# Patient Record
Sex: FEMALE | Race: WHITE | NOT HISPANIC OR LATINO | Employment: UNEMPLOYED | URBAN - NONMETROPOLITAN AREA
[De-identification: names, ages, dates, MRNs, and addresses within clinical notes are randomized per-mention and may not be internally consistent; named-entity substitution may affect disease eponyms.]

---

## 2022-11-05 ENCOUNTER — HOSPITAL ENCOUNTER (OUTPATIENT)
Dept: LAB | Facility: MEDICAL CENTER | Age: 37
End: 2022-11-05
Attending: PHYSICIAN ASSISTANT
Payer: COMMERCIAL

## 2022-11-05 LAB
ALBUMIN SERPL BCP-MCNC: 4.4 G/DL (ref 3.2–4.9)
ALBUMIN/GLOB SERPL: 1.5 G/DL
ALP SERPL-CCNC: 57 U/L (ref 30–99)
ALT SERPL-CCNC: 11 U/L (ref 2–50)
ANION GAP SERPL CALC-SCNC: 9 MMOL/L (ref 7–16)
AST SERPL-CCNC: 15 U/L (ref 12–45)
BASOPHILS # BLD AUTO: 0.8 % (ref 0–1.8)
BASOPHILS # BLD: 0.05 K/UL (ref 0–0.12)
BILIRUB SERPL-MCNC: 0.3 MG/DL (ref 0.1–1.5)
BUN SERPL-MCNC: 13 MG/DL (ref 8–22)
CALCIUM SERPL-MCNC: 9.1 MG/DL (ref 8.5–10.5)
CHLORIDE SERPL-SCNC: 107 MMOL/L (ref 96–112)
CO2 SERPL-SCNC: 22 MMOL/L (ref 20–33)
CREAT SERPL-MCNC: 0.67 MG/DL (ref 0.5–1.4)
EOSINOPHIL # BLD AUTO: 0.15 K/UL (ref 0–0.51)
EOSINOPHIL NFR BLD: 2.5 % (ref 0–6.9)
ERYTHROCYTE [DISTWIDTH] IN BLOOD BY AUTOMATED COUNT: 42.5 FL (ref 35.9–50)
EST. AVERAGE GLUCOSE BLD GHB EST-MCNC: 111 MG/DL
GFR SERPLBLD CREATININE-BSD FMLA CKD-EPI: 114 ML/MIN/1.73 M 2
GLOBULIN SER CALC-MCNC: 3 G/DL (ref 1.9–3.5)
GLUCOSE SERPL-MCNC: 101 MG/DL (ref 65–99)
HBA1C MFR BLD: 5.5 % (ref 4–5.6)
HCT VFR BLD AUTO: 42.3 % (ref 37–47)
HGB BLD-MCNC: 13.5 G/DL (ref 12–16)
IMM GRANULOCYTES # BLD AUTO: 0.03 K/UL (ref 0–0.11)
IMM GRANULOCYTES NFR BLD AUTO: 0.5 % (ref 0–0.9)
LYMPHOCYTES # BLD AUTO: 1.89 K/UL (ref 1–4.8)
LYMPHOCYTES NFR BLD: 31.4 % (ref 22–41)
MCH RBC QN AUTO: 28.4 PG (ref 27–33)
MCHC RBC AUTO-ENTMCNC: 31.9 G/DL (ref 33.6–35)
MCV RBC AUTO: 88.9 FL (ref 81.4–97.8)
MONOCYTES # BLD AUTO: 0.55 K/UL (ref 0–0.85)
MONOCYTES NFR BLD AUTO: 9.2 % (ref 0–13.4)
NEUTROPHILS # BLD AUTO: 3.34 K/UL (ref 2–7.15)
NEUTROPHILS NFR BLD: 55.6 % (ref 44–72)
NRBC # BLD AUTO: 0 K/UL
NRBC BLD-RTO: 0 /100 WBC
PLATELET # BLD AUTO: 304 K/UL (ref 164–446)
PMV BLD AUTO: 11.7 FL (ref 9–12.9)
POTASSIUM SERPL-SCNC: 4.4 MMOL/L (ref 3.6–5.5)
PROT SERPL-MCNC: 7.4 G/DL (ref 6–8.2)
RBC # BLD AUTO: 4.76 M/UL (ref 4.2–5.4)
SODIUM SERPL-SCNC: 138 MMOL/L (ref 135–145)
TSH SERPL DL<=0.005 MIU/L-ACNC: 2.36 UIU/ML (ref 0.38–5.33)
WBC # BLD AUTO: 6 K/UL (ref 4.8–10.8)

## 2022-11-05 PROCEDURE — 85025 COMPLETE CBC W/AUTO DIFF WBC: CPT

## 2022-11-05 PROCEDURE — 83036 HEMOGLOBIN GLYCOSYLATED A1C: CPT

## 2022-11-05 PROCEDURE — 36415 COLL VENOUS BLD VENIPUNCTURE: CPT

## 2022-11-05 PROCEDURE — 80053 COMPREHEN METABOLIC PANEL: CPT

## 2022-11-05 PROCEDURE — 84443 ASSAY THYROID STIM HORMONE: CPT

## 2024-02-14 ENCOUNTER — HOSPITAL ENCOUNTER (EMERGENCY)
Facility: MEDICAL CENTER | Age: 39
End: 2024-02-14
Attending: STUDENT IN AN ORGANIZED HEALTH CARE EDUCATION/TRAINING PROGRAM
Payer: COMMERCIAL

## 2024-02-14 VITALS
BODY MASS INDEX: 29.64 KG/M2 | WEIGHT: 156.97 LBS | OXYGEN SATURATION: 97 % | DIASTOLIC BLOOD PRESSURE: 85 MMHG | SYSTOLIC BLOOD PRESSURE: 137 MMHG | RESPIRATION RATE: 17 BRPM | HEIGHT: 61 IN | TEMPERATURE: 97.2 F | HEART RATE: 80 BPM

## 2024-02-14 DIAGNOSIS — F41.9 ANXIETY: ICD-10-CM

## 2024-02-14 DIAGNOSIS — I10 PRIMARY HYPERTENSION: ICD-10-CM

## 2024-02-14 PROCEDURE — 93005 ELECTROCARDIOGRAM TRACING: CPT | Performed by: STUDENT IN AN ORGANIZED HEALTH CARE EDUCATION/TRAINING PROGRAM

## 2024-02-14 PROCEDURE — A9270 NON-COVERED ITEM OR SERVICE: HCPCS | Mod: UD | Performed by: STUDENT IN AN ORGANIZED HEALTH CARE EDUCATION/TRAINING PROGRAM

## 2024-02-14 PROCEDURE — 99283 EMERGENCY DEPT VISIT LOW MDM: CPT

## 2024-02-14 PROCEDURE — 700102 HCHG RX REV CODE 250 W/ 637 OVERRIDE(OP): Mod: UD | Performed by: STUDENT IN AN ORGANIZED HEALTH CARE EDUCATION/TRAINING PROGRAM

## 2024-02-14 RX ORDER — DIAZEPAM 5 MG/1
TABLET ORAL
Status: DISPENSED
Start: 2024-02-14 | End: 2024-02-14

## 2024-02-14 RX ORDER — PROPRANOLOL HYDROCHLORIDE 10 MG/1
10 TABLET ORAL ONCE
Status: COMPLETED | OUTPATIENT
Start: 2024-02-14 | End: 2024-02-14

## 2024-02-14 RX ORDER — PROPRANOLOL HYDROCHLORIDE 10 MG/1
TABLET ORAL
Status: DISPENSED
Start: 2024-02-14 | End: 2024-02-14

## 2024-02-14 RX ORDER — HYDROXYZINE HYDROCHLORIDE 25 MG/1
25 TABLET, FILM COATED ORAL 3 TIMES DAILY PRN
Qty: 30 TABLET | Refills: 0 | Status: SHIPPED | OUTPATIENT
Start: 2024-02-14

## 2024-02-14 RX ORDER — PROPRANOLOL HYDROCHLORIDE 20 MG/1
20 TABLET ORAL 3 TIMES DAILY
Qty: 90 TABLET | Refills: 11 | Status: SHIPPED | OUTPATIENT
Start: 2024-02-14

## 2024-02-14 RX ORDER — DIAZEPAM 5 MG/1
5 TABLET ORAL ONCE
Status: COMPLETED | OUTPATIENT
Start: 2024-02-14 | End: 2024-02-14

## 2024-02-14 RX ADMIN — DIAZEPAM 5 MG: 5 TABLET ORAL at 03:12

## 2024-02-14 RX ADMIN — PROPRANOLOL HYDROCHLORIDE 10 MG: 10 TABLET ORAL at 03:12

## 2024-02-14 ASSESSMENT — FIBROSIS 4 INDEX: FIB4 SCORE: 0.6

## 2024-02-14 NOTE — ED PROVIDER NOTES
ED Provider Note    CHIEF COMPLAINT  Chief Complaint   Patient presents with    Anxiety     Hx. Of anxiety       EXTERNAL RECORDS REVIEWED  Outpatient Notes outpatient visit in 2022 for anxiety on Lexapro.  Patient has previous elective surgery visits and admissions.    HPI/ROS  LIMITATION TO HISTORY   Select: : None  OUTSIDE HISTORIAN(S):  Significant other spouse    Rocio Martinez is a 38 y.o. female who presents due to anxiety, palpitations, trouble sleeping the past 2 nights.  Patient notes a history of anxiety and depression has been on Lexapro for several years.  She was on 10 mg and about 6 months ago decrease this to 5 mg.  Since November or December she has been having gradually increasing anxiety, frequent episodes where she notices palpitations.  For the past 2 days she notes she has not been able to sleep for more than an hour or 2 as the palpitations wake her up from sleep.  She has no known thyroid disease, no diarrhea, no heat intolerance, no weight loss.  She denies suicidal or homicidal ideations.  Denies auditory visual hallucinations.  She is here with her  who corroborates her story of anxiety.  She has an appointment with PCP on Thursday.    PAST MEDICAL HISTORY   has a past medical history of Celiac sprue, Cold (5/17/2016?), Esophageal reflux, Heart burn, Hypoglycemia (pt. treats by eating frequently), Indigestion, Lactose intolerance, Other specified cardiac dysrhythmias(427.89), Pap smear (2/2006=normal), Psychiatric problem, Unspecified constipation, and Unspecified hemorrhoids without mention of complication.    SURGICAL HISTORY   has a past surgical history that includes appendectomy (1996); other orthopedic surgery (1995); dental extraction(s) (2001); mammoplasty augmentation (3/20/2014); mastopexy (3/20/2014); breast implant removal (Bilateral, 6/3/2016); capsulectomy (Bilateral, 6/3/2016); and mastopexy (Bilateral, 6/3/2016).    FAMILY HISTORY  Family History   Problem  "Relation Age of Onset    Diabetes Father     Hypertension Father     Hyperlipidemia Father     Cancer Other        SOCIAL HISTORY  Social History     Tobacco Use    Smoking status: Never    Smokeless tobacco: Never   Substance and Sexual Activity    Alcohol use: Yes     Comment: 3-4 times per year    Drug use: No    Sexual activity: Yes     Partners: Male     Comment:        CURRENT MEDICATIONS  Home Medications       Reviewed by Lucila Peralat R.N. (Registered Nurse) on 02/14/24 at 0351  Med List Status: Partial     Medication Last Dose Status   acetaminophen (TYLENOL) 500 MG TABS  Active   escitalopram (LEXAPRO) 5 MG tablet  Active   triamcinolone acetonide (KENALOG) 0.1 % Cream  Active                    ALLERGIES  Allergies   Allergen Reactions    Augmentin Hives    Lactose Diarrhea     .    Penicillins        PHYSICAL EXAM  VITAL SIGNS: /85   Pulse 80   Temp 36.2 °C (97.2 °F) (Temporal)   Resp 17   Ht 1.549 m (5' 1\")   Wt 71.2 kg (156 lb 15.5 oz)   SpO2 97%   BMI 29.66 kg/m²    Constitutional: Awake and alert. No acute distress.  Head: NCAT.  HEENT: Normal Conjunctiva. PERRLA.  Neck: Grossly normal range of motion. Airway midline.  Cardiovascular: Normal heart rate, Normal rhythm.  Thorax & Lungs: No respiratory distress. Clear to Auscultation bilaterally.  Abdomen: Normal inspection. Nontender. Nondistended  Skin: No obvious rash.  Back: No tenderness, No CVA tenderness.   Musculoskeletal: No obvious deformity. Moves all extremities Well.  Neurologic: A&Ox3.   Psychiatric: Affect appropriate.  Patient is anxious throughout conversation.  Denies SI or HI.  Denies auditory or visual hallucinations.    DIAGNOSTIC STUDIES / PROCEDURES  EKG  I have independently interpreted this EKG  Sinus tachycardia 104. No acute ST/T wave changes. Normal intervals.    COURSE & MEDICAL DECISION MAKING    ED Observation Status? No; Patient does not meet criteria for ED Observation.     INITIAL ASSESSMENT, " COURSE AND PLAN  Care Narrative:   38-year-old female history of depression here for insomnia and worsening anxiety and palpitations for 2 days.  Afebrile, hypertension and tachycardic on arrival  On exam she is alert and oriented x 3, answering questions appropriately.  Denies SI or HI or hallucinations.  Is visibly anxious and does have tachycardia throughout history.  Differential includes thyroid disorder, SI, intoxication, substance use, anxiety, depression, domestic violence.  EKG is sinus tachycardia  Patient with history to explain symptoms,  appears appropriate and is supportive and assists with answering questions.  Do recommend she get labs including potential thyroid studies with her primary care tomorrow however at this time do not see the need for emergent labs.  Will treat symptoms with Valium 5 mg, propranolol.  Will discharge with Atarax and propranolol prescription.  Recommend she discuss these further with her PCP if they would like her on them long-term.  She may also consider increasing her Lexapro with her primary care physician.  Advised to return if she has SI or HI.      ADDITIONAL PROBLEM LIST  None  DISPOSITION AND DISCUSSIONS  I have discussed management of the patient with the following physicians and STEPHANIE's:  None    Discussion of management with other QHP or appropriate source(s): None     Escalation of care considered, and ultimately not performed:acute inpatient care management, however at this time, the patient is most appropriate for outpatient management    Barriers to care at this time, including but not limited to:  None .     Decision tools and prescription drugs considered including, but not limited to: Medication modification consider lexapro increase, consider propranolol PRN .    FINAL DIAGNOSIS  1. Anxiety    2. Primary hypertension           Electronically signed by: Tee Germain D.O., 2/14/2024 3:28 AM

## 2024-02-14 NOTE — ED TRIAGE NOTES
Back charting due to downtime:    Chief Complaint   Patient presents with    Anxiety     Hx. Of anxiety     Patient ambulatory to triage for the above complaint. Patient states that she has been having uncontrollable panic attacks for the past 48 hours. Patient has a prescription for Lexapro and has been taking it with no relief of symptoms. Patient states that she has never had panic attacks like this before. Patient also concerned about the palpitations that she has been having with her panic attacks. Per patient, 20 years ago she had a 2 D echo complete that showed a leaky valve but she has not had any follow up for it since then.    EKG ordered and complete in triage per protocol.

## 2024-02-14 NOTE — ED NOTES
Patient discharged home per ERP.  Discharge teaching and education discussed with patient. POC discussed.   Patient verbalized understanding of discharge teaching and education. No other questions at this time.     RX x 2 given to patient.       VSS. Patient alert and oriented. Patient arranged ride for self. Able to ambulate off unit safely with steady gait.

## 2024-08-24 ENCOUNTER — NON-PROVIDER VISIT (OUTPATIENT)
Dept: URGENT CARE | Facility: PHYSICIAN GROUP | Age: 39
End: 2024-08-24

## 2024-08-24 DIAGNOSIS — Z11.1 ENCOUNTER FOR PPD SKIN TEST READING: ICD-10-CM

## 2024-08-24 PROCEDURE — 86580 TB INTRADERMAL TEST: CPT | Performed by: FAMILY MEDICINE

## 2024-08-24 NOTE — PROGRESS NOTES
Rocio Martinez is a 39 y.o. female here for a non-provider visit for PPD placement -- Step 1 of 1    Reason for PPD:  work requirement    1. TB evaluation questionnaire completed by patient? Yes      -  If any answers marked yes did you contact a provider prior to placing? Not Indicated  2.  Patient notified to return to clinic for reading on: Monday 8/26/24 AFTER 9:33AM or Tuesday 8/27/24 BEFORE 9:33AM   3.  PPD Placement documentation completed on TB evaluation questionnaire? YES   4.  Location of TB evaluation questionnaire filed: [facility]

## 2024-08-26 ENCOUNTER — NON-PROVIDER VISIT (OUTPATIENT)
Dept: URGENT CARE | Facility: PHYSICIAN GROUP | Age: 39
End: 2024-08-26

## 2024-08-26 LAB — TB WHEAL 3D P 5 TU DIAM: NORMAL MM

## 2024-08-26 NOTE — PROGRESS NOTES
Rocio Martinez is a 39 y.o. female here for a non-provider visit for PPD reading -- Step 1 of 1.      1.  Resulted in Epic under enter/edit results? Yes   2.  TB evaluation questionnaire scanned into chart and original given to patient?Yes      3. Was induration greater than 0 mm? No.      Routed to PCP? No